# Patient Record
Sex: FEMALE | Race: WHITE | NOT HISPANIC OR LATINO | Employment: FULL TIME | URBAN - METROPOLITAN AREA
[De-identification: names, ages, dates, MRNs, and addresses within clinical notes are randomized per-mention and may not be internally consistent; named-entity substitution may affect disease eponyms.]

---

## 2019-12-04 ENCOUNTER — LAB REQUISITION (OUTPATIENT)
Dept: LAB | Facility: HOSPITAL | Age: 29
End: 2019-12-04

## 2019-12-04 DIAGNOSIS — Q52.3 IMPERFORATE HYMEN: ICD-10-CM

## 2019-12-04 PROCEDURE — 88305 TISSUE EXAM BY PATHOLOGIST: CPT | Performed by: OBSTETRICS & GYNECOLOGY

## 2019-12-09 LAB
CYTO UR: NORMAL
LAB AP CASE REPORT: NORMAL
LAB AP CLINICAL INFORMATION: NORMAL
PATH REPORT.FINAL DX SPEC: NORMAL
PATH REPORT.GROSS SPEC: NORMAL

## 2021-10-27 ENCOUNTER — OFFICE VISIT (OUTPATIENT)
Dept: SLEEP MEDICINE | Facility: HOSPITAL | Age: 31
End: 2021-10-27

## 2021-10-27 VITALS
OXYGEN SATURATION: 98 % | SYSTOLIC BLOOD PRESSURE: 126 MMHG | BODY MASS INDEX: 41.59 KG/M2 | HEIGHT: 66 IN | WEIGHT: 258.8 LBS | HEART RATE: 84 BPM | DIASTOLIC BLOOD PRESSURE: 70 MMHG

## 2021-10-27 DIAGNOSIS — R06.83 SNORING: Primary | ICD-10-CM

## 2021-10-27 DIAGNOSIS — G47.61 PLMD (PERIODIC LIMB MOVEMENT DISORDER): ICD-10-CM

## 2021-10-27 DIAGNOSIS — E66.09 CLASS 1 OBESITY DUE TO EXCESS CALORIES WITHOUT SERIOUS COMORBIDITY WITH BODY MASS INDEX (BMI) OF 31.0 TO 31.9 IN ADULT: ICD-10-CM

## 2021-10-27 DIAGNOSIS — G47.33 OBSTRUCTIVE SLEEP APNEA, ADULT: ICD-10-CM

## 2021-10-27 PROCEDURE — 99203 OFFICE O/P NEW LOW 30 MIN: CPT | Performed by: INTERNAL MEDICINE

## 2021-10-27 RX ORDER — LEVONORGESTREL AND ETHINYL ESTRADIOL 0.15-0.03
1 KIT ORAL DAILY
COMMUNITY

## 2021-10-27 RX ORDER — FERROUS SULFATE 325(65) MG
65 TABLET ORAL
COMMUNITY
End: 2023-04-03

## 2021-11-05 NOTE — PROGRESS NOTES
Chief Complaint  Snoring, Witnessed Apnea, Daytime Sleepiness, Non-restorative Sleep, and Leg/body Jerks During Sleep    Subjective        Joann Sánchez presents to [facility] for the evaluation of snoring and nonrestorative sleep.  Her primary care physician is Dr. Dozier.  She is seen in person in the sleep clinic  History of Present Illness  Patient says she has been feeling very tired for the past year and a half.  She has been noting low iron levels.  She says both of her parents however also history of obstructive sleep apnea.  She has been told she snores loudly and has had apneas noted.  She has had snoring noted for at least 2 years, the past couple of months.  She had gained weight.  She denies awakening gasping for breath.  She is not rested however in the morning.  She has a morning headache 2 to 3 days/week.    She is sleepy during the day and may fall asleep if sitting quietly.  She denies awakening with a dry mouth.  She denies breaking her nose.  She does have nasal allergies but denies having much trouble breathing through her nose.  She has no kicking of her legs at night.  She does not think it is her weight.  She does have some sciatic pain.    She goes to bed between 11 PM and midnight.  She will fall asleep in 30 minutes.  She awakens 0-1 time per night.  She thinks she gets 7 to 8 hours of sleep but is not rested.  She naps for 30 to 60 minutes each day.  She denies any history of hypertension, diabetes, coronary artery disease.  She has 3 of anxiety and depression    Allergies: She has seasonal environmental allergies    Habits: Smoking: Without    Ethanol: She has 1 drink 2-4 times a month    Caffeine: She has 1 cup of coffee 2-3 times per week.  She has an occasional serving of tea.  She has 1-2 lucrecia per day.    Medications include ferrous sulfate, hydrocodone, ibuprofen, Seasonale, Zoloft.    Surgeries without    Family history is positive for diabetes  "sleep apnea and cancer    Review of systems positive for fatigue, nosebleeds, apnea, decreased libido, back pain, environmental allergies, a. other systems reviewed and negative.    [place] score is 10/24  Objective   Vital Signs:   /70 (BP Location: Left arm, Patient Position: Sitting, Cuff Size: Adult)   Pulse 84   Ht 167.6 cm (66\")   Wt 117 kg (258 lb 12.8 oz)   SpO2 98%   BMI 41.77 kg/m²     Physical Exam    Patient appears to be awake and alert.  She does not appear to be in acute respiratory distress.    She is normocephalic she has Mallampati class III anatomy.    Lungs are clear.    Cardiac exam revealed normal S1-S2.    Extremities showed no edema.  Result Review :            Assessment and Plan   Diagnoses and all orders for this visit:    1. Snoring (Primary)  -     Home Sleep Study; Future    2. Obstructive sleep apnea, adult  -     Home Sleep Study; Future    3. PLMD (periodic limb movement disorder)    4. Class 1 obesity due to excess calories without serious comorbidity with body mass index (BMI) of 31.0 to 31.9 in adult    Patient history of snoring observed apneas.  She has nonrestorative sleep.  She gives an excellent story for obstructive sleep apnea.  We will plan to proceed to home sleep testing.  We have discussed possible therapies including CPAP, weight control, oral appliance, and surgery.  We have discussed the long-term consequences of untreated obstructive sleep apnea.  These include hypertension, diabetes, heart disease, stroke, and dementia.  She is urged to lose weight.  She is encouraged to avoid alcohol and sedatives close to bedtime.  She is encouraged to practice lateral position sleep    Patient also had has occasional kicking of her legs at night.  We may need to consider further therapy of this.  She is to continue with her iron replacement at this time.  I spent 35 minutes caring for Joann on this date of service. This time includes time spent by me in the " following activities:obtaining and/or reviewing a separately obtained history, performing a medically appropriate examination and/or evaluation , counseling and educating the patient/family/caregiver, ordering medications, tests, or procedures and documenting information in the medical record  Follow Up   Return in about 2 weeks (around 11/10/2021) for Follow up after study, Next scheduled follow-up.  Patient was given instructions and counseling regarding her condition or for health maintenance advice. Please see specific information pulled into the AVS if appropriate.   Jim Farfan MD [facility]  Sleep Medicine  Pulmonary and Critical Care Medicine

## 2021-11-12 ENCOUNTER — HOSPITAL ENCOUNTER (OUTPATIENT)
Dept: SLEEP MEDICINE | Facility: HOSPITAL | Age: 31
Discharge: HOME | End: 2021-11-12
Admitting: INTERNAL MEDICINE

## 2021-11-12 VITALS — HEIGHT: 66 IN | WEIGHT: 258 LBS | BODY MASS INDEX: 41.46 KG/M2

## 2021-11-12 DIAGNOSIS — R06.83 SNORING: ICD-10-CM

## 2021-11-12 DIAGNOSIS — G47.33 OBSTRUCTIVE SLEEP APNEA, ADULT: ICD-10-CM

## 2021-11-12 PROCEDURE — 95800 SLP STDY UNATTENDED: CPT

## 2021-11-12 PROCEDURE — 95800 SLP STDY UNATTENDED: CPT | Performed by: INTERNAL MEDICINE

## 2021-11-15 ENCOUNTER — TELEPHONE (OUTPATIENT)
Dept: SLEEP MEDICINE | Facility: HOSPITAL | Age: 31
End: 2021-11-15

## 2021-11-15 NOTE — TELEPHONE ENCOUNTER
Joann Sánchez called after attempting her device on Friday night and getting issues with progressing through the instructions.  DEAN Cruz had called Galo Sandoval RRT,DAPHNEYGT, RST, [facility] on Friday night to inquire about how to help patient.  I had asked Krishna to give the patient the tech support number off the box so that a call log could be initiated.  Speaking with Joann this morning, she stated she called [facility] and they told her to call them the next night when she was ready to hook the device up again and she did.  They trouble-shooted the device with her and were not able to get the device to test.  They told her they were going to escalate the issue to the next tier technologist at [facility].  When I called her this morning, she told me the steps that [facility] had taken. I assured her that we will get her tested, either with this disposable unit or with a reusable WatchPAT that she would have to  and return the next day and she is fine with that.  She was mainly concerned about our 72 hour window and did not want to be deemed non-compliant with that.  I let her know we appreciate her willingness to keep the 72 hour requirement and certainly will not hold any technical difficulties against her.  We will work through this and I will communicate with her once I hear back from [facility] on what they determined was the issue.  The patient did tell me that she has no Ring, no Nest, no Jenifer, basically no Bluetooth devices in her home other than cell phone.

## 2021-11-18 DIAGNOSIS — G47.33 OBSTRUCTIVE SLEEP APNEA, ADULT: Primary | ICD-10-CM

## 2021-11-19 NOTE — PROGRESS NOTES
CALLED PATIENT AND ADVISED OF STUDY RESULTS. PATIENT VERBALIZED UNDERSTANDING AND WAS AGREEABLE TO PAP THERAPY. FAXED ORDER TO [facility] 11/19/21 JOSEF

## 2022-01-31 ENCOUNTER — OFFICE VISIT (OUTPATIENT)
Dept: SLEEP MEDICINE | Facility: HOSPITAL | Age: 32
End: 2022-01-31

## 2022-01-31 VITALS
DIASTOLIC BLOOD PRESSURE: 71 MMHG | BODY MASS INDEX: 39.37 KG/M2 | HEIGHT: 68 IN | HEART RATE: 76 BPM | WEIGHT: 259.8 LBS | SYSTOLIC BLOOD PRESSURE: 139 MMHG | OXYGEN SATURATION: 97 %

## 2022-01-31 DIAGNOSIS — G47.33 OBSTRUCTIVE SLEEP APNEA, ADULT: Primary | ICD-10-CM

## 2022-01-31 PROCEDURE — 99213 OFFICE O/P EST LOW 20 MIN: CPT | Performed by: NURSE PRACTITIONER

## 2022-01-31 NOTE — PROGRESS NOTES
"    Chief Complaint:   Chief Complaint   Patient presents with   • Sleeping Problem       HPI:    Joann Sánchez is a 32 y.o. female here for follow-up of sleep apnea.  Patient was last seen in consult 10/27/2021 for excessive daytime sleepiness, snoring x2 years, morning headaches 2-3 times a week and history of sleep apnea in both her parents.  She had a sleep study 11/12/2021 that showed mild obstructive sleep apnea and she did initiate CPAP therapy.  Patient states she originally had difficulty getting used to CPAP but feels she is doing much better at this time.  She is sleeping 7 to 8 hours nightly and does feel rested upon awakening.  She goes to sleep within 30 minutes and does get up 0-1 times at night.  Patient states her headaches are \"much better.\"  Patient has [place] score of 7/24.  She has no concerns or complaints regarding CPAP use and wishes to continue therapy.        Current medications are:   Current Outpatient Medications:   •  ferrous sulfate 325 (65 FE) MG tablet, Take 65 mg by mouth., Disp: , Rfl:   •  HYDROcodone-acetaminophen (NORCO) 5-325 MG per tablet, Take 1 tablet by mouth Every 6 (Six) Hours As Needed., Disp: 10 tablet, Rfl: 0  •  ibuprofen (ADVIL,MOTRIN) 800 MG tablet, Take 1 tablet by mouth Every 8 (Eight) Hours As Needed., Disp: 20 tablet, Rfl: 0  •  levonorgestrel-ethinyl estradiol (SEASONALE) 0.15-0.03 MG per tablet, Take 1 tablet by mouth Daily., Disp: , Rfl:   •  sertraline (ZOLOFT) 50 MG tablet, Take 50 mg by mouth Daily., Disp: , Rfl: .      The patient's relevant past medical, surgical, family and social history were reviewed and updated in Epic as appropriate.       Review of Systems   Eyes: Positive for visual disturbance.   Respiratory: Positive for apnea.    Psychiatric/Behavioral: Positive for dysphoric mood and sleep disturbance. The patient is nervous/anxious.    All other systems reviewed and are negative.        Objective:    Physical Exam  Constitutional:       " Appearance: Normal appearance.   HENT:      Head: Normocephalic and atraumatic.      Mouth/Throat:      Comments: Mallampati 3 anatomy  Cardiovascular:      Rate and Rhythm: Normal rate and regular rhythm.   Pulmonary:      Effort: Pulmonary effort is normal.      Breath sounds: Normal breath sounds.   Skin:     General: Skin is warm and dry.   Neurological:      Mental Status: She is alert and oriented to person, place, and time.   Psychiatric:         Mood and Affect: Mood normal.         Behavior: Behavior normal.         Thought Content: Thought content normal.         Judgment: Judgment normal.     16/30 days of use  Greater than 4-hour use 53%  95th percentile pressure 8.5  AHI 1      ASSESSMENT/PLAN    Diagnoses and all orders for this visit:    1. Obstructive sleep apnea, adult (Primary)  -     CPAP Therapy            1. Counseled patient regarding multimodal approach with healthy nutrition, healthy sleep, regular physical activity, social activities, counseling, and medications. Encouraged to practice lateral sleep position. Avoid alcohol and sedatives close to bedtime.  2. Refill supplies x1 year.  Return to clinic 1 year or sooner symptoms warrant.    I have reviewed the results of my evaluation and impression and discussed my recommendations in detail with the patient.      Signed by  CALEB Rosales    January 31, 2022      CC: Provider, No Known          No ref. provider found

## 2023-04-03 ENCOUNTER — OFFICE VISIT (OUTPATIENT)
Dept: SLEEP MEDICINE | Facility: HOSPITAL | Age: 33
End: 2023-04-03
Payer: COMMERCIAL

## 2023-04-03 VITALS
HEIGHT: 67 IN | BODY MASS INDEX: 41.44 KG/M2 | SYSTOLIC BLOOD PRESSURE: 121 MMHG | DIASTOLIC BLOOD PRESSURE: 86 MMHG | OXYGEN SATURATION: 98 % | HEART RATE: 84 BPM | WEIGHT: 264 LBS

## 2023-04-03 DIAGNOSIS — G47.33 OBSTRUCTIVE SLEEP APNEA, ADULT: Primary | ICD-10-CM

## 2023-04-03 PROCEDURE — 99213 OFFICE O/P EST LOW 20 MIN: CPT | Performed by: NURSE PRACTITIONER

## 2023-04-03 RX ORDER — LEVOCETIRIZINE DIHYDROCHLORIDE 5 MG/1
5 TABLET, FILM COATED ORAL EVERY EVENING
COMMUNITY

## 2023-04-03 RX ORDER — SERTRALINE HYDROCHLORIDE 100 MG/1
TABLET, FILM COATED ORAL
COMMUNITY
Start: 2022-11-20

## 2023-04-03 NOTE — PROGRESS NOTES
Chief Complaint:   Chief Complaint   Patient presents with   • Follow-up       HPI:    Joann Sánchez is a 33 y.o. female here for follow-up of sleep apnea.  Patient was last seen 1/31/2022.  Patient continues to do well with CPAP therapy.  Patient is sleeping 7 to 8 hours nightly and does feel rested upon awakening.  Patient goes to sleep within 30 minutes will get up anywhere 0-1 time in the night.  Patient has an [place] score of 10/24.  Patient was originally scheduled to take her SIM card to her DME for us to get a download but states she has forgotten to do so and we have no data to review today.  Patient states she is doing well and has no concerns.        Current medications are:   Current Outpatient Medications:   •  ibuprofen (ADVIL,MOTRIN) 800 MG tablet, Take 1 tablet by mouth Every 8 (Eight) Hours As Needed., Disp: 20 tablet, Rfl: 0  •  levocetirizine (XYZAL) 5 MG tablet, Take 1 tablet by mouth Every Evening., Disp: , Rfl:   •  levonorgestrel-ethinyl estradiol (SEASONALE) 0.15-0.03 MG per tablet, Take 1 tablet by mouth Daily., Disp: , Rfl:   •  sertraline (ZOLOFT) 100 MG tablet, , Disp: , Rfl: .      The patient's relevant past medical, surgical, family and social history were reviewed and updated in Epic as appropriate.       Review of Systems   Eyes: Positive for visual disturbance.   Respiratory: Positive for apnea.    Psychiatric/Behavioral: Positive for dysphoric mood and sleep disturbance. The patient is nervous/anxious.    All other systems reviewed and are negative.        Objective:    Physical Exam  Constitutional:       Appearance: Normal appearance.   HENT:      Head: Normocephalic and atraumatic.      Mouth/Throat:      Comments: Mallampati 3 anatomy  Cardiovascular:      Rate and Rhythm: Normal rate and regular rhythm.   Pulmonary:      Effort: Pulmonary effort is normal.      Breath sounds: Normal breath sounds.   Skin:     General: Skin is warm and dry.   Neurological:      Mental  "Status: She is alert and oriented to person, place, and time.   Psychiatric:         Mood and Affect: Mood normal.         Behavior: Behavior normal.         Thought Content: Thought content normal.         Judgment: Judgment normal.     /86 (BP Location: Left arm, Patient Position: Sitting)   Pulse 84   Ht 170.2 cm (67\")   Wt 120 kg (264 lb)   SpO2 98%   BMI 41.35 kg/m²       CPAP Report  No download available    The patient continues to use and benefit from CPAP therapy.    ASSESSMENT/PLAN    Diagnoses and all orders for this visit:    1. Obstructive sleep apnea, adult (Primary)  -     PAP Therapy        1. Counseled patient regarding multimodal approach with healthy nutrition, healthy sleep, regular physical activity, social activities, counseling, and medications. Encouraged to practice lateral sleep position. Avoid alcohol and sedatives close to bedtime.  2.   Refill supplies x1 year.  Return to clinic 1 year sooner symptoms warrant.    I have reviewed the results of my evaluation and impression and discussed my recommendations in detail with the patient.      Signed by  Tavia Carpio, CALEB    April 3, 2023      CC: Melody Dozier MD         No ref. provider found      "

## 2024-04-01 ENCOUNTER — OFFICE VISIT (OUTPATIENT)
Dept: SLEEP MEDICINE | Facility: CLINIC | Age: 34
End: 2024-04-01
Payer: COMMERCIAL

## 2024-04-01 VITALS
OXYGEN SATURATION: 97 % | SYSTOLIC BLOOD PRESSURE: 118 MMHG | BODY MASS INDEX: 41.44 KG/M2 | WEIGHT: 264 LBS | TEMPERATURE: 97.8 F | HEART RATE: 83 BPM | DIASTOLIC BLOOD PRESSURE: 88 MMHG | HEIGHT: 67 IN

## 2024-04-01 DIAGNOSIS — G47.33 OSA (OBSTRUCTIVE SLEEP APNEA): Primary | ICD-10-CM

## 2024-04-01 PROCEDURE — 99213 OFFICE O/P EST LOW 20 MIN: CPT | Performed by: NURSE PRACTITIONER

## 2024-04-01 NOTE — PROGRESS NOTES
Chief Complaint:   Chief Complaint   Patient presents with    Follow-up    Sleep Apnea       HPI:    Joann Sánchez is a 34 y.o. female here for follow-up of sleep apnea.  Patient was last seen 4/3/2023.  Patient is sleeping 7 to 8 hours nightly and does feel rested upon awakening.  Patient goes to sleep within 30 minutes will get up 0-1 time in the night.  Patient has an [place] score of 9/24.  Patient does well with fullface mask in standard tubing.  Patient states she is just now getting back to where she is compliant and will get back into that habit as she is eager to be compliant.  She does feel better when wearing.        Current medications are:   Current Outpatient Medications:     ibuprofen (ADVIL,MOTRIN) 800 MG tablet, Take 1 tablet by mouth Every 8 (Eight) Hours As Needed., Disp: 20 tablet, Rfl: 0    levocetirizine (XYZAL) 5 MG tablet, Take 1 tablet by mouth Every Evening., Disp: , Rfl:     levonorgestrel-ethinyl estradiol (SEASONALE) 0.15-0.03 MG per tablet, Take 1 tablet by mouth Daily., Disp: , Rfl:     sertraline (ZOLOFT) 100 MG tablet, , Disp: , Rfl: .      The patient's relevant past medical, surgical, family and social history were reviewed and updated in Epic as appropriate.       Review of Systems   Eyes:  Positive for visual disturbance.   Respiratory:  Positive for apnea.    Allergic/Immunologic: Positive for environmental allergies.   Neurological:  Positive for headaches. Negative for light-headedness.   Psychiatric/Behavioral:  Positive for dysphoric mood and sleep disturbance. The patient is nervous/anxious.    All other systems reviewed and are negative.        Objective:    Physical Exam  Constitutional:       Appearance: Normal appearance.   HENT:      Head: Normocephalic and atraumatic.      Mouth/Throat:      Comments: Mallampati 3 anatomy  Cardiovascular:      Rate and Rhythm: Normal rate and regular rhythm.   Pulmonary:      Effort: Pulmonary effort is normal.      Breath sounds:  Normal breath sounds.   Skin:     General: Skin is warm and dry.   Neurological:      Mental Status: She is alert and oriented to person, place, and time.   Psychiatric:         Mood and Affect: Mood normal.         Behavior: Behavior normal.         Thought Content: Thought content normal.         Judgment: Judgment normal.         CPAP Report  14/30 days of use  Greater than 4-hour use 22%  95th percentile pressure 8.5  AHI 1    The patient continues to use and benefit from CPAP therapy.    ASSESSMENT/PLAN    Diagnoses and all orders for this visit:    1. CADENCE (obstructive sleep apnea) (Primary)  -     PAP Therapy        Counseled patient regarding multimodal approach with healthy nutrition, healthy sleep, regular physical activity, social activities, counseling, and medications. Encouraged to practice lateral sleep position. Avoid alcohol and sedatives close to bedtime.    Refill supplies x 1 year.  Return to clinic 1 year or sooner as symptoms warrant.      Signed by  CALEB Rosales    April 1, 2024      CC: Melody Dozier MD         No ref. provider found

## 2025-04-01 ENCOUNTER — OFFICE VISIT (OUTPATIENT)
Dept: SLEEP MEDICINE | Facility: CLINIC | Age: 35
End: 2025-04-01
Payer: COMMERCIAL

## 2025-04-01 VITALS
SYSTOLIC BLOOD PRESSURE: 118 MMHG | HEART RATE: 105 BPM | WEIGHT: 279 LBS | DIASTOLIC BLOOD PRESSURE: 74 MMHG | OXYGEN SATURATION: 96 % | BODY MASS INDEX: 43.79 KG/M2 | HEIGHT: 67 IN | TEMPERATURE: 98.4 F

## 2025-04-01 DIAGNOSIS — G47.33 OSA (OBSTRUCTIVE SLEEP APNEA): Primary | ICD-10-CM

## 2025-04-01 PROCEDURE — 99213 OFFICE O/P EST LOW 20 MIN: CPT | Performed by: NURSE PRACTITIONER

## 2025-04-01 NOTE — PROGRESS NOTES
Chief Complaint:   Chief Complaint   Patient presents with    Follow-up    Sleep Apnea       HPI:    Joann Sánchez is a 35 y.o. female here for follow-up of sleep apnea.  Patient was last seen 4/1/2024.  Patient continues to do well with CPAP therapy.  Patient is sleeping 7 to 8 hours nightly.  Patient goes to sleep within 30 minutes and will get up 0-1 time in the night.  Patient has an [place] score of 9/24.  Patient is doing well without concern or complaint and will continue CPAP.        Current medications are:   Current Outpatient Medications:     ibuprofen (ADVIL,MOTRIN) 800 MG tablet, Take 1 tablet by mouth Every 8 (Eight) Hours As Needed., Disp: 20 tablet, Rfl: 0    levocetirizine (XYZAL) 5 MG tablet, Take 1 tablet by mouth Every Evening., Disp: , Rfl:     levonorgestrel-ethinyl estradiol (SEASONALE) 0.15-0.03 MG per tablet, Take 1 tablet by mouth Daily., Disp: , Rfl:     sertraline (ZOLOFT) 100 MG tablet, , Disp: , Rfl: .      The patient's relevant past medical, surgical, family and social history were reviewed and updated in Epic as appropriate.       Review of Systems   Eyes:  Positive for visual disturbance.   Respiratory:  Positive for apnea.    Allergic/Immunologic: Positive for environmental allergies.   Psychiatric/Behavioral:  Positive for dysphoric mood and sleep disturbance. The patient is nervous/anxious.    All other systems reviewed and are negative.        Objective:    Physical Exam  Constitutional:       Appearance: Normal appearance.   HENT:      Head: Normocephalic and atraumatic.      Mouth/Throat:      Comments: Mallampati 3 anatomy  Cardiovascular:      Rate and Rhythm: Regular rhythm. Tachycardia present.   Pulmonary:      Effort: Pulmonary effort is normal.      Breath sounds: Normal breath sounds.   Skin:     General: Skin is warm and dry.   Neurological:      Mental Status: She is alert and oriented to person, place, and time.   Psychiatric:         Mood and Affect: Mood  "normal.         Behavior: Behavior normal.         Thought Content: Thought content normal.         Judgment: Judgment normal.       /74   Pulse 105   Temp 98.4 °F (36.9 °C)   Ht 170.2 cm (67.01\")   Wt 127 kg (279 lb)   SpO2 96%   BMI 43.69 kg/m²     CPAP Report  Greater than 4-hour use 93%  Average use 9 hours 54 minutes  95th percentile pressure 8.5  AHI 1    The patient continues to use and benefit from CPAP therapy.    ASSESSMENT/PLAN    Diagnoses and all orders for this visit:    1. CADENCE (obstructive sleep apnea) (Primary)  -     PAP Therapy        Refill supplies x 1 year.  Return to clinic 1 year or sooner as symptoms warrant.        Signed by  Tavia Carpio, CALEB    April 1, 2025      CC: Melody Dozier MD         No ref. provider found      "